# Patient Record
Sex: FEMALE | Race: WHITE | NOT HISPANIC OR LATINO | Employment: STUDENT | URBAN - METROPOLITAN AREA
[De-identification: names, ages, dates, MRNs, and addresses within clinical notes are randomized per-mention and may not be internally consistent; named-entity substitution may affect disease eponyms.]

---
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
## 2024-05-20 ENCOUNTER — TELEPHONE (OUTPATIENT)
Dept: OBSTETRICS AND GYNECOLOGY | Facility: CLINIC | Age: 19
End: 2024-05-20
Payer: COMMERCIAL

## 2024-05-20 NOTE — TELEPHONE ENCOUNTER
Patient called with concerns of bleeding with Mirena. Patient with mirena inserted 7/13/2022, and reports that she has not had menses since insertion.  She reports bleeding beginning yesterday.  It is light to moderate without pain.  She admits to recent changes in stress.  Discussed with patient that we could bring in for ultrasound and appt to ensure placement, or monitor and call back if continues, worsens or begins to have pain.  Initially patient decided to wait and monitor but then returned called and wished to come in for appt to verify placement.  Appt with ultrasound scheduled for tomorrow @ 1:30 for ultrasound and appt to follow with Ana Laura

## 2024-05-21 ENCOUNTER — OFFICE VISIT (OUTPATIENT)
Dept: OBSTETRICS AND GYNECOLOGY | Facility: CLINIC | Age: 19
End: 2024-05-21
Payer: COMMERCIAL

## 2024-05-21 VITALS
DIASTOLIC BLOOD PRESSURE: 78 MMHG | SYSTOLIC BLOOD PRESSURE: 112 MMHG | HEIGHT: 63 IN | WEIGHT: 259 LBS | BODY MASS INDEX: 45.89 KG/M2

## 2024-05-21 DIAGNOSIS — Z30.433 ENCOUNTER FOR IUD REMOVAL AND REINSERTION: Primary | ICD-10-CM

## 2024-05-21 DIAGNOSIS — Z11.3 SCREENING FOR STDS (SEXUALLY TRANSMITTED DISEASES): ICD-10-CM

## 2024-05-21 DIAGNOSIS — Z30.431 IUD CHECK UP: ICD-10-CM

## 2024-05-21 DIAGNOSIS — T83.32XA MALPOSITIONED INTRAUTERINE DEVICE (IUD), INITIAL ENCOUNTER: ICD-10-CM

## 2024-05-21 NOTE — PROGRESS NOTES
Procedure: IUD Removal and Reinsertion Procedure Note     Procedures    Pre procedure indication     1) Desires Removal of IUD  2) Desires Mirena  Post procedure indication   1) Desires Removal of IUD  2) Desires Mirena                   The patient presents today for removal of her IUD.  She requests removal of her current IUD and immediate placement of another IUD.  The risks, benefits, and alternatives to Mirena were explained at length with the patient. All her questions were answered and consents were signed.  Her LMP was 5/19/24 .  Urine Pregnancy Test was Not done because she has not been SA in 2 years.      The patient was placed in a dorsal lithotomy position on the examining table in [facility]. A bimanual exam confirmed the uterus was anteverted. A speculum was inserted into the vagina and the cervix was brought into view.  An IUD string was visualized.  The string was then grasped and removed intact with gentle traction.  There was a Small amount of bleeding and cramping.    The cervix was then prepped with Betadine. The anterior lip of the cervix was then grasped with a single-tooth tenaculum. The endometrial cavity was then sounded to 7.5 centimeters. The sealed Mirena package was opened and the IUD was removed in a sterile fashion.    The upper edge of the depth setting the flange was set at the uterine sound measurement. The  was then carefully advanced to the cervical canal into the uterus to the level of the fundus.  The slider was then retracted about 1 cm and deployed the device. The device was then gently advanced to the fundus. The IUD was then released by pulling the slider down all the way. The  was removed carefully from the uterus. The threads were then cut leaving 2-3 cm visible outside of the cervix.  The single-tooth tenaculum was removed from the anterior lip. Good hemostasis was noted.   All other instruments were removed from the vagina.     *The IUD had to be placed x  2, after the first placement the medication chamber was visible at the os of the cervix so it was removed. Successful placement on the second try. Pt tolerated well    The patient tolerated the procedure well with a moderate amount of discomfort.  She was monitored for 15 minutes prior to discharge.      There were no complications.    The patient was counseled about the need to return in 4 weeks for U/S IUD check.     She was counseled about the need to use a backup method of contraception such as condoms until her post insertion exam was performed. The patient verbalized understanding when the IUD will need to be removed/replaced. Written information was given to the patient.  The patient is counseled to contact us if she has any significant or increasing bleeding, pain, fever, chills, or other concerns. She is instructed to see a doctor right away if she believes that she may be pregnant at any time with the IUD in place.    Nadege Last, APRN  05/21/2024

## 2024-05-21 NOTE — PROGRESS NOTES
"    Chief Complaint   Patient presents with    IUD Check         Subjective   HPI  Ramírez Owens is a 18 y.o. female, , who presents for IUD check follow up.      Patient called the office yesterday with concerns of bleeding with Mirena. Patient with mirena inserted 2022, and reports that she has not had menses since insertion. She reports bleeding beginning . It is light to moderate with mild. She admits to recent changes in stress. Discussed with patient that we could bring in for ultrasound and appt to ensure placement, or monitor and call back if continues, worsens or begins to have pain. Initially patient decided to wait and monitor but then returned called and wished to come in for appt to verify placement.   She has not been SA in 2 years, agrees to STD screen      The additional following portions of the patient's history were reviewed and updated as appropriate: allergies, current medications, past family history, past medical history, past social history, past surgical history, and problem list.    Did the patient have u/s today? Yes.  Findings showed IUD had incorrect placement.  I have personally evaluated the U/S and agree with the findings. Nadege Last, APRN    Review of Systems   Constitutional: Negative.    Genitourinary:  Positive for vaginal bleeding.     All other systems reviewed and are negative.     I have reviewed and agree with the HPI, ROS, and historical information as entered above. Nadege Last, APRN      Objective   /78   Ht 160 cm (63\")   Wt 117 kg (259 lb)   LMP 2024 (Approximate)   BMI 45.88 kg/m²     Physical Exam  Vitals and nursing note reviewed. Exam conducted with a chaperone present.   Constitutional:       Appearance: Normal appearance.   Pulmonary:      Effort: Pulmonary effort is normal.   Abdominal:      Palpations: Abdomen is soft.   Genitourinary:     Labia:         Right: No rash, tenderness or lesion.         Left: No rash, " tenderness or lesion.       Vagina: Normal. No lesions.      Cervix: No cervical motion tenderness, discharge, lesion or cervical bleeding.      Uterus: Normal. Not enlarged, not fixed and not tender.       Adnexa:         Right: No mass or tenderness.          Left: No mass or tenderness.        Rectum: No external hemorrhoid.      Comments: IUD strings visible and long > 3cm.Chaperone Present  Neurological:      Mental Status: She is alert.         Assessment & Plan     Assessment     Problem List Items Addressed This Visit          Genitourinary and Reproductive     IUD check up    Overview     Mirena placed 2022 she had no issue but began bleeding 5/2024, IUD was in ELIZABETH with continued bleeding. Removal and replacement performed 5/21/24. U/S F/U-         Relevant Orders    US Non-ob Transvaginal     Other Visit Diagnoses       Encounter for IUD removal and reinsertion    -  Primary    Relevant Medications    Levonorgestrel (MIRENA) 20 MCG/DAY IUD intrauterine device 1 each (Completed)    Malpositioned intrauterine device (IUD), initial encounter        Screening for STDs (sexually transmitted diseases)        Relevant Orders    Chlamydia trachomatis, Neisseria gonorrhoeae, Trichomonas vaginalis, PCR - Swab, Cervix            Discussed IUD is in ELIZABETH and with her new onset of bleeding I would recommend removal of the IUD. We discussed the option for replacement of the mirena IUD today, OCP, etc. She desires IUD be replaced. She has not been SA in > 2 years    Plan     Remove malpositioned mirena IUD and replace mirena IUD immediately.   Return in about 4 weeks (around 6/18/2024) for U/S IUD check.  STD screen      Nadege Last, APRN  05/21/2024

## 2024-05-23 LAB
C TRACH RRNA SPEC QL NAA+PROBE: NEGATIVE
N GONORRHOEA RRNA SPEC QL NAA+PROBE: NEGATIVE
T VAGINALIS RRNA SPEC QL NAA+PROBE: NEGATIVE

## 2024-05-28 ENCOUNTER — OFFICE VISIT (OUTPATIENT)
Dept: OBSTETRICS AND GYNECOLOGY | Facility: CLINIC | Age: 19
End: 2024-05-28
Payer: COMMERCIAL

## 2024-05-28 VITALS
WEIGHT: 260 LBS | DIASTOLIC BLOOD PRESSURE: 70 MMHG | BODY MASS INDEX: 46.07 KG/M2 | HEIGHT: 63 IN | SYSTOLIC BLOOD PRESSURE: 120 MMHG

## 2024-05-28 DIAGNOSIS — Z30.431 IUD CHECK UP: Primary | ICD-10-CM

## 2024-05-28 DIAGNOSIS — R10.2 PELVIC PAIN: ICD-10-CM

## 2024-05-28 PROCEDURE — 99213 OFFICE O/P EST LOW 20 MIN: CPT | Performed by: NURSE PRACTITIONER

## 2024-05-28 RX ORDER — IBUPROFEN 600 MG/1
600 TABLET ORAL EVERY 6 HOURS PRN
Qty: 30 TABLET | Refills: 0 | Status: SHIPPED | OUTPATIENT
Start: 2024-05-28

## 2024-05-28 NOTE — LETTER
May 28, 2024     Patient: Ramírez Owens   YOB: 2005   Date of Visit: 5/28/2024       To Whom It May Concern:    It is my medical opinion that Ramírez Owens should remain out of work until 5/30/24 .           Sincerely,        SHANE Moreau    CC: No Recipients

## 2024-05-28 NOTE — PROGRESS NOTES
"    Chief Complaint   Patient presents with    IUD check         Subjective   HPI  Ramírez Owens is a 18 y.o. female, , who presents for IUD check follow up.  She had a Mirena placed on 2024. Since the IUD placement, the patient reports severe abdominal and pelvic pain that she rates a 9/10 .  Severe pain began on Wednesday.   Patient reports minimal bleeding.       The additional following portions of the patient's history were reviewed and updated as appropriate: allergies, current medications, past family history, past medical history, past social history, past surgical history, and problem list.    Did the patient have u/s today? Yes.  Findings showed IUD had correct placement.  I have personally evaluated the U/S and agree with the findings. Nadege Last, SHANE    Review of Systems   Constitutional: Negative.    Gastrointestinal:  Negative for constipation and diarrhea.   Genitourinary:  Positive for pelvic pain. Negative for dysuria and vaginal bleeding.     All other systems reviewed and are negative.     I have reviewed and agree with the HPI, ROS, and historical information as entered above. Nadege Last, APRN      Objective   /70   Ht 160 cm (63\")   Wt 118 kg (260 lb)   LMP 2024 (Approximate)   BMI 46.06 kg/m²     Physical Exam  Constitutional:       Appearance: Normal appearance.   Pulmonary:      Effort: Pulmonary effort is normal.   Neurological:      Mental Status: She is alert.         Assessment & Plan     Assessment     Problem List Items Addressed This Visit          Genitourinary and Reproductive     IUD check up - Primary    Overview     Mirena placed  she had no issue but began bleeding 2024, IUD was in [facility]ETH with continued bleeding. Removal and replacement performed 24. U/S F/U- confirmed correct placement however she was having significant cramping. Try motrin x 2-3 days and call if persistent.          Relevant Orders    US Non-ob Transvaginal " (Completed)     Other Visit Diagnoses       Pelvic pain        Relevant Orders    US Non-ob Transvaginal (Completed)            U/S confirmed correct placement. Ovaries normal. I asked her to come back and leave [facility] and send for culture though she is having no distinct urinary symptoms. STD screen negative last visit. Not currently SA.     Plan     Call or return if symptoms worsen or persist  Return if symptoms worsen or fail to improve, for Annual physical.  Motrin 600 mg q6h x 2-3 days      Nadege Last, APRN  05/28/2024